# Patient Record
Sex: MALE | Race: WHITE | NOT HISPANIC OR LATINO | Employment: OTHER | URBAN - METROPOLITAN AREA
[De-identification: names, ages, dates, MRNs, and addresses within clinical notes are randomized per-mention and may not be internally consistent; named-entity substitution may affect disease eponyms.]

---

## 2018-12-10 ENCOUNTER — TRANSCRIBE ORDERS (OUTPATIENT)
Dept: ADMINISTRATIVE | Facility: HOSPITAL | Age: 73
End: 2018-12-10

## 2018-12-10 DIAGNOSIS — R89.9 ABNORMAL LABORATORY TEST: Primary | ICD-10-CM

## 2018-12-18 ENCOUNTER — HOSPITAL ENCOUNTER (OUTPATIENT)
Dept: ULTRASOUND IMAGING | Facility: HOSPITAL | Age: 73
Discharge: HOME | End: 2018-12-18
Attending: INTERNAL MEDICINE | Admitting: INTERNAL MEDICINE

## 2018-12-18 DIAGNOSIS — R89.9 ABNORMAL LABORATORY TEST: ICD-10-CM

## 2018-12-18 PROCEDURE — 76705 ECHO EXAM OF ABDOMEN: CPT

## 2021-11-22 ENCOUNTER — PRE-ADMISSION TESTING (OUTPATIENT)
Dept: PREADMISSION TESTING | Facility: HOSPITAL | Age: 76
End: 2021-11-22

## 2021-11-22 ENCOUNTER — HOSPITAL ENCOUNTER (OUTPATIENT)
Dept: GENERAL RADIOLOGY | Facility: HOSPITAL | Age: 76
Discharge: HOME | End: 2021-11-22

## 2021-11-22 VITALS — WEIGHT: 202.16 LBS | HEIGHT: 71 IN | BODY MASS INDEX: 28.3 KG/M2

## 2021-11-22 LAB
ANION GAP SERPL CALCULATED.3IONS-SCNC: 9 MMOL/L (ref 5–15)
BUN SERPL-MCNC: 22 MG/DL (ref 8–23)
BUN/CREAT SERPL: 20.2 (ref 7–25)
CALCIUM SPEC-SCNC: 10 MG/DL (ref 8.6–10.5)
CHLORIDE SERPL-SCNC: 105 MMOL/L (ref 98–107)
CO2 SERPL-SCNC: 27 MMOL/L (ref 22–29)
CREAT SERPL-MCNC: 1.09 MG/DL (ref 0.76–1.27)
DEPRECATED RDW RBC AUTO: 43.4 FL (ref 37–54)
ERYTHROCYTE [DISTWIDTH] IN BLOOD BY AUTOMATED COUNT: 13 % (ref 12.3–15.4)
GFR SERPL CREATININE-BSD FRML MDRD: 66 ML/MIN/1.73
GLUCOSE SERPL-MCNC: 88 MG/DL (ref 65–99)
HBA1C MFR BLD: 6.1 % (ref 4.8–5.6)
HCT VFR BLD AUTO: 41.1 % (ref 37.5–51)
HGB BLD-MCNC: 14 G/DL (ref 13–17.7)
MCH RBC QN AUTO: 31.3 PG (ref 26.6–33)
MCHC RBC AUTO-ENTMCNC: 34.1 G/DL (ref 31.5–35.7)
MCV RBC AUTO: 91.9 FL (ref 79–97)
PLATELET # BLD AUTO: 333 10*3/MM3 (ref 140–450)
PMV BLD AUTO: 9.6 FL (ref 6–12)
POTASSIUM SERPL-SCNC: 4.9 MMOL/L (ref 3.5–5.2)
QT INTERVAL: 388 MS
QTC INTERVAL: 419 MS
RBC # BLD AUTO: 4.47 10*6/MM3 (ref 4.14–5.8)
SODIUM SERPL-SCNC: 141 MMOL/L (ref 136–145)
WBC NRBC COR # BLD: 7.25 10*3/MM3 (ref 3.4–10.8)

## 2021-11-22 PROCEDURE — 85027 COMPLETE CBC AUTOMATED: CPT

## 2021-11-22 PROCEDURE — 80048 BASIC METABOLIC PNL TOTAL CA: CPT

## 2021-11-22 PROCEDURE — 93005 ELECTROCARDIOGRAM TRACING: CPT

## 2021-11-22 PROCEDURE — 36415 COLL VENOUS BLD VENIPUNCTURE: CPT

## 2021-11-22 PROCEDURE — 93010 ELECTROCARDIOGRAM REPORT: CPT | Performed by: INTERNAL MEDICINE

## 2021-11-22 PROCEDURE — 71046 X-RAY EXAM CHEST 2 VIEWS: CPT

## 2021-11-22 PROCEDURE — 83036 HEMOGLOBIN GLYCOSYLATED A1C: CPT

## 2021-11-22 RX ORDER — EZETIMIBE 10 MG/1
10 TABLET ORAL NIGHTLY
COMMUNITY

## 2021-11-22 RX ORDER — MULTIPLE VITAMINS W/ MINERALS TAB 9MG-400MCG
1 TAB ORAL DAILY
COMMUNITY

## 2021-11-22 RX ORDER — LORATADINE 10 MG/1
10 TABLET ORAL DAILY
COMMUNITY

## 2021-12-03 ENCOUNTER — ANESTHESIA EVENT (OUTPATIENT)
Dept: PERIOP | Facility: HOSPITAL | Age: 76
End: 2021-12-03

## 2021-12-03 ENCOUNTER — APPOINTMENT (OUTPATIENT)
Dept: PREADMISSION TESTING | Facility: HOSPITAL | Age: 76
End: 2021-12-03

## 2021-12-03 LAB — SARS-COV-2 RNA PNL SPEC NAA+PROBE: NOT DETECTED

## 2021-12-03 PROCEDURE — U0004 COV-19 TEST NON-CDC HGH THRU: HCPCS

## 2021-12-03 PROCEDURE — C9803 HOPD COVID-19 SPEC COLLECT: HCPCS

## 2021-12-05 NOTE — ANESTHESIA PREPROCEDURE EVALUATION
Anesthesia Evaluation     Patient summary reviewed and Nursing notes reviewed   no history of anesthetic complications:  NPO Solid Status: > 8 hours  NPO Liquid Status: > 2 hours           Airway   Mallampati: II  TM distance: >3 FB  Neck ROM: full  No difficulty expected  Dental - normal exam     Pulmonary - negative pulmonary ROS and normal exam   Cardiovascular - normal exam    ECG reviewed    (+) hyperlipidemia,   (-) valvular problems/murmurs, dysrhythmias, angina, NEIL      Neuro/Psych- negative ROS  GI/Hepatic/Renal/Endo    (+)  GERD well controlled,  hepatitis (at age 19), liver disease, thyroid problem hypothyroidism    Musculoskeletal     Abdominal    Substance History      OB/GYN          Other   arthritis,    history of cancer    ROS/Med Hx Other: H/o Lyme's disease                Anesthesia Plan    ASA 3     general with block     intravenous induction       Plan discussed with CRNA.

## 2021-12-06 ENCOUNTER — ANESTHESIA (OUTPATIENT)
Dept: PERIOP | Facility: HOSPITAL | Age: 76
End: 2021-12-06

## 2021-12-06 ENCOUNTER — APPOINTMENT (OUTPATIENT)
Dept: GENERAL RADIOLOGY | Facility: HOSPITAL | Age: 76
End: 2021-12-06

## 2021-12-06 ENCOUNTER — ANESTHESIA EVENT CONVERTED (OUTPATIENT)
Dept: ANESTHESIOLOGY | Facility: HOSPITAL | Age: 76
End: 2021-12-06

## 2021-12-06 ENCOUNTER — HOSPITAL ENCOUNTER (OUTPATIENT)
Facility: HOSPITAL | Age: 76
Discharge: HOME | End: 2021-12-06
Attending: ORTHOPAEDIC SURGERY | Admitting: ORTHOPAEDIC SURGERY

## 2021-12-06 VITALS
HEIGHT: 71 IN | SYSTOLIC BLOOD PRESSURE: 134 MMHG | HEART RATE: 86 BPM | OXYGEN SATURATION: 94 % | RESPIRATION RATE: 16 BRPM | TEMPERATURE: 97.6 F | WEIGHT: 202 LBS | DIASTOLIC BLOOD PRESSURE: 91 MMHG | BODY MASS INDEX: 28.28 KG/M2

## 2021-12-06 PROCEDURE — A9270 NON-COVERED ITEM OR SERVICE: HCPCS | Performed by: ORTHOPAEDIC SURGERY

## 2021-12-06 PROCEDURE — C1776 JOINT DEVICE (IMPLANTABLE): HCPCS | Performed by: ORTHOPAEDIC SURGERY

## 2021-12-06 PROCEDURE — 0 CEFAZOLIN IN DEXTROSE 2-4 GM/100ML-% SOLUTION: Performed by: ORTHOPAEDIC SURGERY

## 2021-12-06 PROCEDURE — 97116 GAIT TRAINING THERAPY: CPT

## 2021-12-06 PROCEDURE — 25010000002 DEXAMETHASONE PER 1 MG: Performed by: NURSE ANESTHETIST, CERTIFIED REGISTERED

## 2021-12-06 PROCEDURE — 63710000001 ACETAMINOPHEN 325 MG TABLET: Performed by: ORTHOPAEDIC SURGERY

## 2021-12-06 PROCEDURE — C1713 ANCHOR/SCREW BN/BN,TIS/BN: HCPCS | Performed by: ORTHOPAEDIC SURGERY

## 2021-12-06 PROCEDURE — 25010000002 PHENYLEPHRINE 10 MG/ML SOLUTION 1 ML VIAL: Performed by: NURSE ANESTHETIST, CERTIFIED REGISTERED

## 2021-12-06 PROCEDURE — 97110 THERAPEUTIC EXERCISES: CPT

## 2021-12-06 PROCEDURE — 63710000001 OXYCODONE 5 MG TABLET: Performed by: ORTHOPAEDIC SURGERY

## 2021-12-06 PROCEDURE — 97161 PT EVAL LOW COMPLEX 20 MIN: CPT

## 2021-12-06 PROCEDURE — 76942 ECHO GUIDE FOR BIOPSY: CPT | Performed by: ORTHOPAEDIC SURGERY

## 2021-12-06 PROCEDURE — 97165 OT EVAL LOW COMPLEX 30 MIN: CPT

## 2021-12-06 PROCEDURE — 97535 SELF CARE MNGMENT TRAINING: CPT

## 2021-12-06 PROCEDURE — 25010000002 VANCOMYCIN: Performed by: ORTHOPAEDIC SURGERY

## 2021-12-06 PROCEDURE — C1889 IMPLANT/INSERT DEVICE, NOC: HCPCS | Performed by: ORTHOPAEDIC SURGERY

## 2021-12-06 PROCEDURE — 25010000002 PROPOFOL 10 MG/ML EMULSION: Performed by: NURSE ANESTHETIST, CERTIFIED REGISTERED

## 2021-12-06 PROCEDURE — 25010000002 NEOSTIGMINE 10 MG/10ML SOLUTION: Performed by: NURSE ANESTHETIST, CERTIFIED REGISTERED

## 2021-12-06 PROCEDURE — 25010000002 FENTANYL CITRATE (PF) 50 MCG/ML SOLUTION: Performed by: NURSE ANESTHETIST, CERTIFIED REGISTERED

## 2021-12-06 PROCEDURE — 97530 THERAPEUTIC ACTIVITIES: CPT

## 2021-12-06 DEVICE — IMPLANTABLE DEVICE
Type: IMPLANTABLE DEVICE | Site: SHOULDER | Status: FUNCTIONAL
Brand: EQUINOXE

## 2021-12-06 DEVICE — KWIRE EQUINOXE GLENOID NITNL 2X190MM NS: Type: IMPLANTABLE DEVICE | Site: SHOULDER | Status: FUNCTIONAL

## 2021-12-06 DEVICE — STEM HUM PRI EQUINOXE PRESSFIT 9MM SHT: Type: IMPLANTABLE DEVICE | Site: SHOULDER | Status: FUNCTIONAL

## 2021-12-06 DEVICE — IMPLANTABLE DEVICE: Type: IMPLANTABLE DEVICE | Site: SHOULDER | Status: FUNCTIONAL

## 2021-12-06 DEVICE — SUT FW #2 W/TPR NDL 1/2 CIR 38IN 97CM 26.5MM BLU: Type: IMPLANTABLE DEVICE | Site: SHOULDER | Status: FUNCTIONAL

## 2021-12-06 RX ORDER — HYDROMORPHONE HYDROCHLORIDE 1 MG/ML
0.5 INJECTION, SOLUTION INTRAMUSCULAR; INTRAVENOUS; SUBCUTANEOUS
Status: DISCONTINUED | OUTPATIENT
Start: 2021-12-06 | End: 2021-12-06 | Stop reason: HOSPADM

## 2021-12-06 RX ORDER — LIDOCAINE HYDROCHLORIDE 10 MG/ML
0.5 INJECTION, SOLUTION EPIDURAL; INFILTRATION; INTRACAUDAL; PERINEURAL ONCE AS NEEDED
Status: COMPLETED | OUTPATIENT
Start: 2021-12-06 | End: 2021-12-06

## 2021-12-06 RX ORDER — SODIUM CHLORIDE 0.9 % (FLUSH) 0.9 %
10 SYRINGE (ML) INJECTION EVERY 12 HOURS SCHEDULED
Status: DISCONTINUED | OUTPATIENT
Start: 2021-12-06 | End: 2021-12-06 | Stop reason: HOSPADM

## 2021-12-06 RX ORDER — CEFAZOLIN SODIUM 2 G/100ML
2 INJECTION, SOLUTION INTRAVENOUS ONCE
Status: COMPLETED | OUTPATIENT
Start: 2021-12-06 | End: 2021-12-06

## 2021-12-06 RX ORDER — BUPIVACAINE HCL/0.9 % NACL/PF 0.125 %
PLASTIC BAG, INJECTION (ML) EPIDURAL AS NEEDED
Status: DISCONTINUED | OUTPATIENT
Start: 2021-12-06 | End: 2021-12-06 | Stop reason: SURG

## 2021-12-06 RX ORDER — MIDAZOLAM HYDROCHLORIDE 1 MG/ML
0.5 INJECTION INTRAMUSCULAR; INTRAVENOUS
Status: DISCONTINUED | OUTPATIENT
Start: 2021-12-06 | End: 2021-12-06 | Stop reason: HOSPADM

## 2021-12-06 RX ORDER — NALOXONE HCL 0.4 MG/ML
0.1 VIAL (ML) INJECTION
Status: DISCONTINUED | OUTPATIENT
Start: 2021-12-06 | End: 2021-12-06 | Stop reason: HOSPADM

## 2021-12-06 RX ORDER — MEPERIDINE HYDROCHLORIDE 25 MG/ML
12.5 INJECTION INTRAMUSCULAR; INTRAVENOUS; SUBCUTANEOUS
Status: DISCONTINUED | OUTPATIENT
Start: 2021-12-06 | End: 2021-12-06 | Stop reason: HOSPADM

## 2021-12-06 RX ORDER — ACETAMINOPHEN 325 MG/1
650 TABLET ORAL EVERY 4 HOURS PRN
Status: DISCONTINUED | OUTPATIENT
Start: 2021-12-06 | End: 2021-12-06 | Stop reason: HOSPADM

## 2021-12-06 RX ORDER — LIDOCAINE HYDROCHLORIDE 10 MG/ML
INJECTION, SOLUTION EPIDURAL; INFILTRATION; INTRACAUDAL; PERINEURAL AS NEEDED
Status: DISCONTINUED | OUTPATIENT
Start: 2021-12-06 | End: 2021-12-06 | Stop reason: SURG

## 2021-12-06 RX ORDER — FENTANYL CITRATE 50 UG/ML
50 INJECTION, SOLUTION INTRAMUSCULAR; INTRAVENOUS
Status: DISCONTINUED | OUTPATIENT
Start: 2021-12-06 | End: 2021-12-06 | Stop reason: HOSPADM

## 2021-12-06 RX ORDER — FENTANYL CITRATE 50 UG/ML
INJECTION, SOLUTION INTRAMUSCULAR; INTRAVENOUS
Status: COMPLETED | OUTPATIENT
Start: 2021-12-06 | End: 2021-12-06

## 2021-12-06 RX ORDER — ROCURONIUM BROMIDE 10 MG/ML
INJECTION, SOLUTION INTRAVENOUS AS NEEDED
Status: DISCONTINUED | OUTPATIENT
Start: 2021-12-06 | End: 2021-12-06 | Stop reason: SURG

## 2021-12-06 RX ORDER — OXYCODONE HYDROCHLORIDE 5 MG/1
5 TABLET ORAL EVERY 4 HOURS PRN
Qty: 25 TABLET | Refills: 0 | OUTPATIENT
Start: 2021-12-06 | End: 2022-11-08

## 2021-12-06 RX ORDER — NALOXONE HCL 0.4 MG/ML
0.4 VIAL (ML) INJECTION AS NEEDED
Status: DISCONTINUED | OUTPATIENT
Start: 2021-12-06 | End: 2021-12-06 | Stop reason: HOSPADM

## 2021-12-06 RX ORDER — FAMOTIDINE 10 MG/ML
20 INJECTION, SOLUTION INTRAVENOUS ONCE
Status: DISCONTINUED | OUTPATIENT
Start: 2021-12-06 | End: 2021-12-06 | Stop reason: HOSPADM

## 2021-12-06 RX ORDER — DEXAMETHASONE SODIUM PHOSPHATE 4 MG/ML
INJECTION, SOLUTION INTRA-ARTICULAR; INTRALESIONAL; INTRAMUSCULAR; INTRAVENOUS; SOFT TISSUE AS NEEDED
Status: DISCONTINUED | OUTPATIENT
Start: 2021-12-06 | End: 2021-12-06 | Stop reason: SURG

## 2021-12-06 RX ORDER — OXYCODONE HYDROCHLORIDE 5 MG/1
5 TABLET ORAL EVERY 4 HOURS PRN
Status: DISCONTINUED | OUTPATIENT
Start: 2021-12-06 | End: 2021-12-06 | Stop reason: HOSPADM

## 2021-12-06 RX ORDER — PROPOFOL 10 MG/ML
VIAL (ML) INTRAVENOUS AS NEEDED
Status: DISCONTINUED | OUTPATIENT
Start: 2021-12-06 | End: 2021-12-06 | Stop reason: SURG

## 2021-12-06 RX ORDER — EPHEDRINE SULFATE 50 MG/ML
5 INJECTION, SOLUTION INTRAVENOUS ONCE AS NEEDED
Status: DISCONTINUED | OUTPATIENT
Start: 2021-12-06 | End: 2021-12-06 | Stop reason: HOSPADM

## 2021-12-06 RX ORDER — FAMOTIDINE 20 MG/1
20 TABLET, FILM COATED ORAL ONCE
Status: COMPLETED | OUTPATIENT
Start: 2021-12-06 | End: 2021-12-06

## 2021-12-06 RX ORDER — BUPIVACAINE HYDROCHLORIDE 2.5 MG/ML
INJECTION, SOLUTION EPIDURAL; INFILTRATION; INTRACAUDAL
Status: COMPLETED | OUTPATIENT
Start: 2021-12-06 | End: 2021-12-06

## 2021-12-06 RX ORDER — GLYCOPYRROLATE 0.2 MG/ML
INJECTION INTRAMUSCULAR; INTRAVENOUS AS NEEDED
Status: DISCONTINUED | OUTPATIENT
Start: 2021-12-06 | End: 2021-12-06 | Stop reason: SURG

## 2021-12-06 RX ORDER — SODIUM CHLORIDE 0.9 % (FLUSH) 0.9 %
10 SYRINGE (ML) INJECTION AS NEEDED
Status: DISCONTINUED | OUTPATIENT
Start: 2021-12-06 | End: 2021-12-06 | Stop reason: HOSPADM

## 2021-12-06 RX ORDER — ONDANSETRON 2 MG/ML
4 INJECTION INTRAMUSCULAR; INTRAVENOUS ONCE AS NEEDED
Status: DISCONTINUED | OUTPATIENT
Start: 2021-12-06 | End: 2021-12-06 | Stop reason: HOSPADM

## 2021-12-06 RX ORDER — MAGNESIUM HYDROXIDE 1200 MG/15ML
LIQUID ORAL AS NEEDED
Status: DISCONTINUED | OUTPATIENT
Start: 2021-12-06 | End: 2021-12-06 | Stop reason: HOSPADM

## 2021-12-06 RX ORDER — SODIUM CHLORIDE 450 MG/100ML
50 INJECTION, SOLUTION INTRAVENOUS CONTINUOUS
Status: DISCONTINUED | OUTPATIENT
Start: 2021-12-06 | End: 2021-12-06 | Stop reason: HOSPADM

## 2021-12-06 RX ORDER — SODIUM CHLORIDE, SODIUM LACTATE, POTASSIUM CHLORIDE, CALCIUM CHLORIDE 600; 310; 30; 20 MG/100ML; MG/100ML; MG/100ML; MG/100ML
9 INJECTION, SOLUTION INTRAVENOUS CONTINUOUS
Status: DISCONTINUED | OUTPATIENT
Start: 2021-12-06 | End: 2021-12-06 | Stop reason: HOSPADM

## 2021-12-06 RX ORDER — ACETAMINOPHEN 500 MG
1000 TABLET ORAL ONCE
Status: COMPLETED | OUTPATIENT
Start: 2021-12-06 | End: 2021-12-06

## 2021-12-06 RX ORDER — SODIUM CHLORIDE, SODIUM LACTATE, POTASSIUM CHLORIDE, CALCIUM CHLORIDE 600; 310; 30; 20 MG/100ML; MG/100ML; MG/100ML; MG/100ML
100 INJECTION, SOLUTION INTRAVENOUS CONTINUOUS
Status: DISCONTINUED | OUTPATIENT
Start: 2021-12-06 | End: 2021-12-06 | Stop reason: HOSPADM

## 2021-12-06 RX ORDER — NEOSTIGMINE METHYLSULFATE 1 MG/ML
INJECTION, SOLUTION INTRAVENOUS AS NEEDED
Status: DISCONTINUED | OUTPATIENT
Start: 2021-12-06 | End: 2021-12-06 | Stop reason: SURG

## 2021-12-06 RX ADMIN — Medication 100 MCG: at 10:32

## 2021-12-06 RX ADMIN — BUPIVACAINE HYDROCHLORIDE 15 ML: 2.5 INJECTION, SOLUTION EPIDURAL; INFILTRATION; INTRACAUDAL at 08:34

## 2021-12-06 RX ADMIN — Medication 100 MCG: at 10:41

## 2021-12-06 RX ADMIN — LIDOCAINE HYDROCHLORIDE 0.5 ML: 10 INJECTION, SOLUTION EPIDURAL; INFILTRATION; INTRACAUDAL; PERINEURAL at 08:00

## 2021-12-06 RX ADMIN — GLYCOPYRROLATE 0.4 MG: 0.2 INJECTION INTRAMUSCULAR; INTRAVENOUS at 11:34

## 2021-12-06 RX ADMIN — OXYCODONE 5 MG: 5 TABLET ORAL at 14:03

## 2021-12-06 RX ADMIN — DEXAMETHASONE SODIUM PHOSPHATE 8 MG: 4 INJECTION, SOLUTION INTRA-ARTICULAR; INTRALESIONAL; INTRAMUSCULAR; INTRAVENOUS; SOFT TISSUE at 09:58

## 2021-12-06 RX ADMIN — Medication 100 MCG: at 10:10

## 2021-12-06 RX ADMIN — PHENYLEPHRINE HYDROCHLORIDE 0.5 MCG/KG/MIN: 10 INJECTION INTRAVENOUS at 10:54

## 2021-12-06 RX ADMIN — SODIUM CHLORIDE 50 ML/HR: 4.5 INJECTION, SOLUTION INTRAVENOUS at 14:03

## 2021-12-06 RX ADMIN — VANCOMYCIN HYDROCHLORIDE 1500 MG: 10 INJECTION, POWDER, LYOPHILIZED, FOR SOLUTION INTRAVENOUS at 09:57

## 2021-12-06 RX ADMIN — CEFAZOLIN SODIUM 2 G: 2 INJECTION, SOLUTION INTRAVENOUS at 09:09

## 2021-12-06 RX ADMIN — ACETAMINOPHEN 650 MG: 325 TABLET, FILM COATED ORAL at 14:03

## 2021-12-06 RX ADMIN — Medication 100 MCG: at 10:37

## 2021-12-06 RX ADMIN — PROPOFOL 150 MG: 10 INJECTION, EMULSION INTRAVENOUS at 09:58

## 2021-12-06 RX ADMIN — NEOSTIGMINE METHYLSULFATE 3 MG: 0.5 INJECTION INTRAVENOUS at 11:34

## 2021-12-06 RX ADMIN — SODIUM CHLORIDE, POTASSIUM CHLORIDE, SODIUM LACTATE AND CALCIUM CHLORIDE: 600; 310; 30; 20 INJECTION, SOLUTION INTRAVENOUS at 11:27

## 2021-12-06 RX ADMIN — FENTANYL CITRATE 100 MCG: 50 INJECTION, SOLUTION INTRAMUSCULAR; INTRAVENOUS at 08:34

## 2021-12-06 RX ADMIN — FAMOTIDINE 20 MG: 20 TABLET ORAL at 08:16

## 2021-12-06 RX ADMIN — Medication 100 MCG: at 10:46

## 2021-12-06 RX ADMIN — Medication 100 MCG: at 10:20

## 2021-12-06 RX ADMIN — ROCURONIUM BROMIDE 50 MG: 10 INJECTION, SOLUTION INTRAVENOUS at 09:58

## 2021-12-06 RX ADMIN — LIDOCAINE HYDROCHLORIDE 50 MG: 10 INJECTION, SOLUTION EPIDURAL; INFILTRATION; INTRACAUDAL; PERINEURAL at 09:58

## 2021-12-06 RX ADMIN — ACETAMINOPHEN 1000 MG: 500 TABLET ORAL at 08:16

## 2021-12-06 RX ADMIN — SODIUM CHLORIDE, POTASSIUM CHLORIDE, SODIUM LACTATE AND CALCIUM CHLORIDE 9 ML/HR: 600; 310; 30; 20 INJECTION, SOLUTION INTRAVENOUS at 08:00

## 2021-12-06 NOTE — PLAN OF CARE
Goal Outcome Evaluation:  Plan of Care Reviewed With: patient, spouse        Progress: improving  Outcome Summary: PT eval completed. Patient alert and oriented x4. Able to perform bed mobility with supervision, transfers with SBA, ambulation x 75' SBA, and stair navigation 2x5 steps SBA without loss of balance. No further IP PT services warranted at this time. Recommend home with assist at dc.

## 2021-12-06 NOTE — DISCHARGE INSTR - ACTIVITY
Non weight bearing right upper extremity.  You christoph shower after you remove your nerve catheter.  Absolutely no tub bathing or immersing your incision in water of any kind.   Only do the exercises as instructed by your therapist.   Please keep your dressing in place until your follow up appointment.

## 2021-12-06 NOTE — OP NOTE
DATE OF OPERATION: 12/06/21  PREOPERATIVE DIAGNOSIS: Primary osteoarthritis of right shoulder [717018]  POSTOPERATIVE DIAGNOSES:  1. Primary osteoarthritis of right shoulder [779695]  2. Biceps tenosynovitis.    PROCEDURES PERFORMED:  1.  Right reverse total shoulder arthroplasty.    2.  Right biceps tenodesis.    SURGEON: Sen Pretty MD  ASSISTANTS:  1.  Mert Quezada MD, PGY 5  2.  López Faith MD, PGY 6 sports fellow   ANESTHESIA: General plus block.    ESTIMATED BLOOD LOSS:160mL.    COMPLICATIONS: None.    DISPOSITION: Recovery room in stable condition.    IMPLANTS: [employer] Equinoxe reverse total shoulder system, 9 mm stem press-fit, 0 metal liner tray, 38 x 0 polyethylene tray, right posterior augment standard size baseplate with 4 screws with locking caps, and a 38 mm glenosphere.    INDICATIONS: This is a 76-year-old male with right shoulder pain and limited function and motion secondary to arthropathy. They have failed conservative treatment and after a discussion of risks, benefits, and alternatives, wished to proceed with shoulder arthroplasty.  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  DESCRIPTION OF PROCEDURE: On the day of surgery, the patient identified the right shoulder as the correct operative extremity. This was initialed by the surgeon with the patients's acknowledgment. The patient underwent placement of an interscalene block and was taken to the operating room and placed in the supine position. Upon induction of adequate anesthesia, the patient was brought up to the beach chair position and the shoulder and upper extremity were prepped and draped in the usual sterile fashion. Timeout confirmed the correct patient and operative extremity as well as that antibiotics were on board. A standard deltopectoral approach to the shoulder was carried out. It was carried sharply through the skin and subcutaneous tissue. Medial and lateral flaps were developed over the deltopectoral fascia.  The cephalic vein was identified and mobilized laterally with the deltoid. The subdeltoid and subpectoral spaces were mobilized and a blunt retractor was placed deep to this. The clavipectoral fascia was opened on the lateral edge of the conjoined tendon and the retractor was moved deep to this. The leading edge of the pectoralis was released exposing the long head of the biceps. This was tenosynovitic. It was tenodesed to the pectoralis and released proximal to this. The 3 sisters were identified and coagulated. A subscapularis tenotomy was performed 1 cm medial to the lesser tuberosity and rotator interval was released to the glenoid exposing the humeral head. The inferior capsule was released directly off the humerus to allow greater than 90° of external rotation. The anatomic neck was exposed and the humeral head osteotomy was performed in approximately 30° of retroversion. The remainder of the osteophytes were removed. The canal was then entered, reamed, and broached. The final stem impacted in in approximately 30° of retroversion.  The supraspinatus appeared very degenerative and was peeling some during exposure and the redness, a stiff and poorly mobile subscap, as well as his 19 degrees of retroversion and B2 glenoid and age the decision was made to proceed to a reverse.  A head protector was placed. The humerus was subluxed posteriorly. The glenoid exposed. Circumferential labral excision and capsular release were performed. A 270° mobilization of the subscapularis was carried out as well.  The coracoid and coracoid base were exposed for CT-guided navigation. The coracoid block was applied and fixed with 2 screws. We then registered the glenoid using the probe and CT-guided navigation for calibration. Using the live navigation system, a centering hole was drilled, a guidewire placed, and the glenoid was reamed to the appropriate depth, version, and inclination according to the preoperative plan. The large  central hole for the baseplate was drilled under live navigation and the cage glenoid baseplate impacted in, with excellent purchase to press-fit. Screws were then placed also under live navigation in the inferior, superior, anteroinferior, and posteroinferior locations. Locking caps were placed. The glenosphere was then inserted and locked into place with a set screw.  The humerus was carefully subluxed back anteriorly. A liner tray and polyethylene were placed and trialing was carried out. The appropriate final sizes were chosen and locked into place.  The shoulder was then reduced.  This allowed nearly full passive range of motion with no instability. The joint was copiously irrigated with orthopedic irrigation mixed with Betadine after the final implants were assembled and locked into place.  The subscapularis was not repairable.  Passive range range of motion will be full elevation but external rotation will be limited to 30° in the perioperative period. The deltopectoral interval was approximated with 0 Vicryl, the subcutaneous tissue with 2-0 Vicryl, and the skin with Monocryl and Dermabond. A sterile dressing was placed. Anesthesia was reversed and the patient was taken to the recovery room in stable condition. All instrument, needle, and sponge counts were correct.      Sen Pretty MD*

## 2021-12-06 NOTE — ANESTHESIA POSTPROCEDURE EVALUATION
Patient: Yuriy Wolf    Procedure Summary     Date: 12/06/21 Room / Location:  [facility] /  [facility]    Anesthesia Start: 0953 Anesthesia Stop:     Procedure: TOTAL REVERSE SHOULDER ARTHROPLASTY, BICEPS TENODESIS RIGHT (Right Shoulder) Diagnosis:       Primary osteoarthritis of right shoulder      Right bicipital tenosynovitis      (Primary osteoarthritis of right shoulder [864051])    Surgeons: Sen Pretty MD Provider: Angela Kyle MD    Anesthesia Type: general with block ASA Status: 3          Anesthesia Type: general with block    Vitals  No vitals data found for the desired time range.          Post Anesthesia Care and Evaluation    Patient location during evaluation: PACU  Patient participation: complete - patient participated  Level of consciousness: awake and alert  Pain score: 0  Pain management: adequate  Airway patency: patent  Anesthetic complications: No anesthetic complications  PONV Status: none  Cardiovascular status: hemodynamically stable and acceptable  Respiratory status: nonlabored ventilation, acceptable and nasal cannula  Hydration status: acceptable

## 2021-12-06 NOTE — THERAPY EVALUATION
Patient Name: Yuriy Wolf  : 1945    MRN: [id number]                              Today's Date: 2021       Admit Date: 2021    Visit Dx: No diagnosis found.  There is no problem list on file for this patient.    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 from mono age 20   • Hyperlipidemia    • Low testosterone in male    • Lyme disease 2019   • Mononucleosis    • Seasonal allergies    • Wears glasses      Past Surgical History:   Procedure Laterality Date   • BASAL CELL CARCINOMA EXCISION     • COLONOSCOPY     • EYE SURGERY Left     left eye biopsy and cornea scraping   • EYE SURGERY      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   • EYE SURGERY      biopsy of conjunctive ; destruction of lesion on cornia of left eye   • HAND SURGERY Right    • HAND SURGERY Right     remove broken hamate hook and carpal tunnel release   • JOINT REPLACEMENT Right     knee   • KNEE ACL RECONSTRUCTION Right     x2 (1994, dec 2021   • KNEE SURGERY  1994    removed screw from failed r knee ACL repair    • 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   • NECK LESION BIOPSY     • SKIN BIOPSY      lower left rear of neck    • SKIN CANCER EXCISION      removed from back of left neck       General Information     Row Name 21 1530          Physical Therapy Time and Intention    Document Type discharge evaluation/summary  -LO     Mode of Treatment individual therapy; physical therapy  -LO     Row Name 21 1530          General Information    Patient Profile Reviewed yes  -LO     Prior Level of Function independent:; all household mobility; gait; transfer  -LO     Existing Precautions/Restrictions right; shoulder; non-weight bearing  -LO     Barriers to Rehab  none identified  -LO     Row Name 12/06/21 1530          Living Environment    Lives With spouse  -     Row Name 12/06/21 1530          Home Main Entrance    Number of Stairs, Main Entrance ten  -LO     Stair Railings, Main Entrance railings safe and in good condition  -     Row Name 12/06/21 1530          Stairs Within Home, Primary    Stairs, Within Home, Primary 5+5 split foyer  -LO     Number of Stairs, Within Home, Primary none  -LO     Row Name 12/06/21 1530          Cognition    Orientation Status (Cognition) oriented x 3  -LO     Row Name 12/06/21 1530          Safety Issues, Functional Mobility    Safety Issues Affecting Function (Mobility) other (see comments)  none  -LO     Impairments Affecting Function (Mobility) endurance/activity tolerance  -LO     Comment, Safety Issues/Impairments (Mobility) no safety issues noted  -LO           User Key  (r) = Recorded By, (t) = Taken By, (c) = Cosigned By    Initials Name Provider Type    Tena Peter PT Physical Therapist               Mobility     Row Name 12/06/21 1532          Bed Mobility    Bed Mobility supine-sit; sit-supine  -LO     Supine-Sit Frederic (Bed Mobility) supervision  -LO     Sit-Supine Frederic (Bed Mobility) supervision  -LO     Assistive Device (Bed Mobility) head of bed elevated  -     Comment (Bed Mobility) no physical assist required, good sequencing noted  -     Row Name 12/06/21 1532          Transfers    Comment (Transfers) CGA for transfers for safety, no AD needed  -     Row Name 12/06/21 1532          Bed-Chair Transfer    Bed-Chair Frederic (Transfers) not tested  -     Row Name 12/06/21 1532          Sit-Stand Transfer    Sit-Stand Frederic (Transfers) contact guard  -     Row Name 12/06/21 1532          Gait/Stairs (Locomotion)    Frederic Level (Gait) standby assist  -     Assistive Device (Gait) other (see comments)  no AD  -LO     Distance in Feet (Gait) 75  -LO     Deviations/Abnormal  Patterns (Gait) other (see comments)  no deviations noted  -LO     [place] Level (Stairs) stand by assist  -LO     Assistive Device (Stairs) --  no AD  -LO     Number of Steps (Stairs) 5x2  -LO     Ascending Technique (Stairs) step-over-step  -LO     Descending Technique (Stairs) step-over-step  -LO     Comment (Gait/Stairs) Ambulates x 75' without AD with SBA. No gait deviations noted. Able to navigate 5 steps x2 with SBA, no loss of balance noted.  -LO     Row Name 12/06/21 1532          Mobility    Extremity Weight-bearing Status right upper extremity  -LO     Right Upper Extremity (Weight-bearing Status) non weight-bearing (NWB)  -LO           User Key  (r) = Recorded By, (t) = Taken By, (c) = Cosigned By    Initials Name Provider Type    Tena Peter, JANEY Physical Therapist               Obj/Interventions     Row Name 12/06/21 1537          Range of Motion Comprehensive    General Range of Motion bilateral lower extremity ROM WNL  -     Row Name 12/06/21 1537          Strength Comprehensive (MMT)    General Manual Muscle Testing (MMT) Assessment no strength deficits identified  -     Row Name 12/06/21 1537          Balance    Balance Assessment sitting static balance; sitting dynamic balance; standing static balance; standing dynamic balance  -LO     Static Sitting Balance WFL; supported; sitting, edge of bed  -LO     Dynamic Sitting Balance WFL; supported; sitting, edge of bed  -LO     Static Standing Balance WFL; supported; standing  -LO     Dynamic Standing Balance WFL; supported; standing  -LO     Comment, Balance SBA for standing, no AD requried. No loss of balance noted.  -LO           User Key  (r) = Recorded By, (t) = Taken By, (c) = Cosigned By    Initials Name Provider Type    Tena Peter, JANEY Physical Therapist               Goals/Plan    No documentation.                Clinical Impression     Row Name 12/06/21 1538          Pain Scale: FACES Pre/Post-Treatment    Pain: FACES Scale,  Pretreatment 2-->hurts little bit  -LO     Posttreatment Pain Rating 2-->hurts little bit  -LO     Pain Location - Side Right  -LO     Pain Location - Orientation generalized  -LO     Pain Location shoulder  -LO     Row Name 12/06/21 1538          Plan of Care Review    Plan of Care Reviewed With patient; spouse  -LO     Progress improving  -LO     Outcome Summary PT eval completed. Patient alert and oriented x4. Able to perform bed mobility with supervision, transfers with SBA, ambulation x 75' SBA, and stair navigation 2x5 steps SBA without loss of balance. No further IP PT services warranted at this time. Recommend home with assist at MA.  -LO     Row Name 12/06/21 1538          Therapy Assessment/Plan (PT)    Patient/Family Therapy Goals Statement (PT) go home.  -LO     Rehab Potential (PT) other (see comments)  eval only  -LO     Criteria for Skilled Interventions Met (PT) no; no problems identified which require skilled intervention  -LO     Row Name 12/06/21 1538          Vital Signs    O2 Delivery Pre Treatment room air  -LO     O2 Delivery Intra Treatment room air  -LO     O2 Delivery Post Treatment room air  -LO     Pre Patient Position Supine  -LO     Intra Patient Position Standing  -LO     Post Patient Position Supine  -LO     Rest Breaks  1  -LO     Row Name 12/06/21 1538          Positioning and Restraints    Pre-Treatment Position in bed  -LO     Post Treatment Position bed  -LO     In Bed encouraged to call for assist; supine; call light within reach; exit alarm on; fowlers; with family/caregiver  -           User Key  (r) = Recorded By, (t) = Taken By, (c) = Cosigned By    Initials Name Provider Type    Tena Peter, PT Physical Therapist               Outcome Measures     Row Name 12/06/21 5102          How much help from another person do you currently need...    Turning from your back to your side while in flat bed without using bedrails? 4  -LO     Moving from lying on back to sitting on  the side of a flat bed without bedrails? 4  -LO     Moving to and from a bed to a chair (including a wheelchair)? 4  -LO     Standing up from a chair using your arms (e.g., wheelchair, bedside chair)? 4  -LO     Climbing 3-5 steps with a railing? 4  -LO     To walk in hospital room? 4  -LO     AM-PAC 6 Clicks Score (PT) 24  -LO     Row Name 12/06/21 1542 12/06/21 1522       Functional Assessment    Outcome Measure Options AM-PAC 6 Clicks Basic Mobility (PT)  -LO AM-PAC 6 Clicks Daily Activity (OT)  -MA          User Key  (r) = Recorded By, (t) = Taken By, (c) = Cosigned By    Initials Name Provider Type    Tena Peter, PT Physical Therapist    Brittany Venegas, OT Occupational Therapist                             Physical Therapy Education                 Title: PT OT SLP Therapies (Done)     Topic: Physical Therapy (Done)     Point: Mobility training (Done)     Learning Progress Summary           Patient Acceptance, E, VU,NR by  at 12/6/2021 1520    Comment: Patient education regarding safe sequencing for stepping.                   Point: Home exercise program (Done)     Learning Progress Summary           Patient Acceptance, E, VU,NR by  at 12/6/2021 1520    Comment: Patient education regarding safe sequencing for stepping.                   Point: Body mechanics (Done)     Learning Progress Summary           Patient Acceptance, E, VU,NR by  at 12/6/2021 1520    Comment: Patient education regarding safe sequencing for stepping.                   Point: Precautions (Done)     Learning Progress Summary           Patient Acceptance, E, VU,NR by  at 12/6/2021 1520    Comment: Patient education regarding safe sequencing for stepping.                               User Key     Initials Effective Dates Name Provider Type Discipline     06/16/21 -  Tena Ha, PT Physical Therapist PT              PT Recommendation and Plan     Plan of Care Reviewed With: patient, spouse  Progress: improving  Outcome  Summary: PT eval completed. Patient alert and oriented x4. Able to perform bed mobility with supervision, transfers with SBA, ambulation x 75' SBA, and stair navigation 2x5 steps SBA without loss of balance. No further IP PT services warranted at this time. Recommend home with assist at [place].     Time Calculation:    PT Charges     Row Name 12/06/21 1520             Time Calculation    Start Time 1520  -LO      PT Received On 12/06/21  -LO      PT Goal Re-Cert Due Date 12/16/21  -LO              Timed Charges    19095 - Gait Training Minutes  12  -LO              Untimed Charges    PT Eval/Re-eval Minutes 33  -LO              Total Minutes    Timed Charges Total Minutes 12  -LO      Untimed Charges Total Minutes 33  -LO       Total Minutes 45  -LO            User Key  (r) = Recorded By, (t) = Taken By, (c) = Cosigned By    Initials Name Provider Type    Tena Peter, PT Physical Therapist              Therapy Charges for Today     Code Description Service Date Service Provider Modifiers Qty    60703264766 HC GAIT TRAINING EA 15 MIN 12/6/2021 Tena Ha, PT GP 1    28652111503 HC PT EVAL LOW COMPLEXITY 3 12/6/2021 Tena Ha, PT GP 1          PT G-Codes  Outcome Measure Options: AM-PAC 6 Clicks Basic Mobility (PT)  AM-PAC 6 Clicks Score (PT): 24  AM-PAC 6 Clicks Score (OT): 18    Tena Ha PT  12/6/2021

## 2021-12-06 NOTE — PLAN OF CARE
Goal Outcome Evaluation:  Plan of Care Reviewed With: patient, spouse           Outcome Summary: OT eval complete. Pt w/ arrowpump infusing at 6, c/o mild RUE pain. Pt's wife available for education. Pt tolerated 10 reps of RUE HEP, min A for doffing/donning RUE sling & UBD using marianna-dressing technique, sup for bed mobility, CGA for STS & functional mobility in govea w/ no noted O2 desat. Recommend home w/ A at discharge.

## 2021-12-06 NOTE — ANESTHESIA PROCEDURE NOTES
Airway  Urgency: elective    Date/Time: 12/6/2021 9:59 AM  Airway not difficult    General Information and Staff    Patient location during procedure: OR  CRNA: Jacky Orellana CRNA    Indications and Patient Condition  Indications for airway management: airway protection    Preoxygenated: yes  MILS not maintained throughout  Mask difficulty assessment: 1 - vent by mask    Final Airway Details  Final airway type: endotracheal airway      Successful airway: ETT  Cuffed: yes   Successful intubation technique: direct laryngoscopy  Endotracheal tube insertion site: oral  Blade: Medel  Blade size: 2  ETT size (mm): 7.5  Cormack-Lehane Classification: grade IIb - view of arytenoids or posterior of glottis only  Placement verified by: chest auscultation and capnometry   Cuff volume (mL): 5  Measured from: lips  ETT/EBT  to lips (cm): 23  Number of attempts at approach: 1  Assessment: lips, teeth, and gum same as pre-op and atraumatic intubation    Additional Comments  Negative epigastric sounds, Breath sound equal bilaterally with symmetric chest rise and fall

## 2021-12-06 NOTE — THERAPY EVALUATION
Patient Name: Yuriy Wolf  : 1945    MRN: [id number]                              Today's Date: 2021       Admit Date: 2021    Visit Dx: No diagnosis found.  There is no problem list on file for this patient.    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 from mono age 20   • Hyperlipidemia    • Low testosterone in male    • Lyme disease 2019   • Mononucleosis    • Seasonal allergies    • Wears glasses      Past Surgical History:   Procedure Laterality Date   • BASAL CELL CARCINOMA EXCISION     • COLONOSCOPY     • EYE SURGERY Left     left eye biopsy and cornea scraping   • EYE SURGERY      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   • EYE SURGERY      biopsy of conjunctive ; destruction of lesion on cornia of left eye   • HAND SURGERY Right    • HAND SURGERY Right     remove broken hamate hook and carpal tunnel release   • JOINT REPLACEMENT Right     knee   • KNEE ACL RECONSTRUCTION Right     x2 (1994, dec 2021   • KNEE SURGERY  1994    removed screw from failed r knee ACL repair    • 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   • NECK LESION BIOPSY     • SKIN BIOPSY      lower left rear of neck    • SKIN CANCER EXCISION      removed from back of left neck       General Information     Row Name 21 1507          OT Time and Intention    Document Type evaluation  -MA     Mode of Treatment individual therapy; occupational therapy  -MA     Row Name 21 1507          General Information    Patient Profile Reviewed yes  -MA     Prior Level of Function independent:; bed mobility; ADL's; transfer; all household mobility  -MA     Existing Precautions/Restrictions right; shoulder; non-weight bearing  Ernestina ultra II sling, R interscalene  nerve catheter  -MA     Barriers to Rehab none identified  -MA     Row Name 12/06/21 1507          Living Environment    Lives With spouse  -MA     Row Name 12/06/21 1507          Home Main Entrance    Number of Stairs, Main Entrance other (see comments)  10 steps in ( by landing 5 + 5)  -MA     Row Name 12/06/21 1507          Cognition    Orientation Status (Cognition) oriented x 3  -MA     Row Name 12/06/21 1507          Safety Issues, Functional Mobility    Safety Issues Affecting Function (Mobility) awareness of need for assistance; safety precaution awareness; safety precautions follow-through/compliance; sequencing abilities  -MA     Impairments Affecting Function (Mobility) endurance/activity tolerance; pain; sensation/sensory awareness; range of motion (ROM); strength  -MA           User Key  (r) = Recorded By, (t) = Taken By, (c) = Cosigned By    Initials Name Provider Type    Brittany Venegas OT Occupational Therapist                 Mobility/ADL's     Row Name 12/06/21 1509          Bed Mobility    Bed Mobility supine-sit; sit-supine  -MA     Supine-Sit [place] (Bed Mobility) supervision  -MA     Sit-Supine [place] (Bed Mobility) supervision  -MA     Assistive Device (Bed Mobility) head of bed elevated  -MA     Row Name 12/06/21 1509          Transfers    Transfers sit-stand transfer  -MA     Sit-Stand [place] (Transfers) contact guard  -MA     Row Name 12/06/21 1509          Sit-Stand Transfer    Assistive Device (Sit-Stand Transfers) --  No AD  -MA     [facility] Name 12/06/21 1509          Functional Mobility    Functional Mobility- Ind. Level contact guard assist; verbal cues required  -MA     Functional Mobility- Device --  No AD  -MA     Functional Mobility-Distance (Feet) 150  -MA     Functional Mobility- Comment Pt CGA for 150ft functional mobility in govea, pt c/o increased weakness in BLE's as mobility progressed  -MA     Row Name 12/06/21 1509          Activities of Daily  Living    BADL Assessment/Intervention upper body dressing; lower body dressing; bathing  -MA     Row Name 12/06/21 1509          Mobility    Extremity Weight-bearing Status right upper extremity  -MA     Right Upper Extremity (Weight-bearing Status) non weight-bearing (NWB)  -MA     Row Name 12/06/21 1509          Upper Body Dressing Assessment/Training    [place] Level (Upper Body Dressing) doff; don; other (see comments); front opening garment; minimum assist (75% patient effort); verbal cues; nonverbal cues (demo/gesture)  -MA     Position (Upper Body Dressing) edge of bed sitting  -MA     Comment (Upper Body Dressing) Pt's wife available for education, demo'd ability to doff/don RUE sling & front opening garment w/ min A, VC's and demo for sequencing. Pt and wife educated on and verbalized understanding of marianna-dressing technique & management of interscalene nerve catheter w/ ADLs to avoid dislodgement.  -MA     Row Name 12/06/21 1509          Lower Body Dressing Assessment/Training    [place] Level (Lower Body Dressing) don; pants/bottoms; moderate assist (50% patient effort); verbal cues  -MA     Position (Lower Body Dressing) edge of bed sitting; supported standing  -MA     Comment (Lower Body Dressing) Pt mod A for donning pants & undergarment.  -MA     Row Name 12/06/21 1509          Bathing Assessment/Intervention    Comment (Bathing) Pt & wife educated on importance of R axilla care and proper technique to maintain RUE precautions.  -MA           User Key  (r) = Recorded By, (t) = Taken By, (c) = Cosigned By    Initials Name Provider Type    Brittany Venegas OT Occupational Therapist               Obj/Interventions     Row Name 12/06/21 1514          Sensory Assessment (Somatosensory)    Sensory Assessment (Somatosensory) right UE  -MA     Sensory Subjective Reports numbness; tingling  -MA     Row Name 12/06/21 1514          Vision Assessment/Intervention    Visual Impairment/Limitations WFL   -MA     Row Name 12/06/21 1514          Range of Motion Comprehensive    General Range of Motion upper extremity range of motion deficits identified  -MA     Comment, General Range of Motion LUE AROM WFL; RUE PROM shoulder FE no limit, IR to chest, ER to 30, AROM elbow/wrist/hand & scapular retractions.  -MA     Row Name 12/06/21 1514          Shoulder (Therapeutic Exercise)    Shoulder (Therapeutic Exercise) PROM (passive range of motion); AROM (active range of motion)  -MA     Shoulder AROM (Therapeutic Exercise) bilateral; scapular retraction; 10 repetitions; sitting  -MA     Shoulder PROM (Therapeutic Exercise) right; flexion; extension; external rotation; internal rotation; sitting; 10 repetitions  -MA     Row Name 12/06/21 1514          Elbow/Forearm (Therapeutic Exercise)    Elbow/Forearm (Therapeutic Exercise) AAROM (active assistive range of motion)  -MA     Elbow/Forearm AAROM (Therapeutic Exercise) right; flexion; extension; supination; pronation; 10 repetitions; sitting  -MA     Row Name 12/06/21 1514          Wrist (Therapeutic Exercise)    Wrist (Therapeutic Exercise) AROM (active range of motion)  -MA     Wrist AROM (Therapeutic Exercise) right; flexion; extension; 10 repetitions  -MA     Row Name 12/06/21 1514          Hand (Therapeutic Exercise)    Hand (Therapeutic Exercise) AROM (active range of motion)  -MA     Hand AROM/AAROM (Therapeutic Exercise) right; AROM (active range of motion); finger flexion; finger extension; 10 repetitions  -MA     Row Name 12/06/21 1514          Motor Skills    Motor Skills therapeutic exercise  -MA     Row Name 12/06/21 1514          Balance    Balance Assessment sitting static balance; sitting dynamic balance; standing static balance; standing dynamic balance  -MA     Static Sitting Balance WFL; unsupported; sitting, edge of bed  -MA     Dynamic Sitting Balance WFL; unsupported; sitting, edge of bed  -MA     Static Standing Balance mild impairment; standing;  unsupported  -MA     Dynamic Standing Balance mild impairment; unsupported; standing  -MA     Balance Interventions sit to stand; occupation based/functional task  -MA     Row Name 12/06/21 1514          Therapeutic Exercise    Therapeutic Exercise shoulder; elbow/forearm; wrist; hand  -MA           User Key  (r) = Recorded By, (t) = Taken By, (c) = Cosigned By    Initials Name Provider Type    Brittany Venegas OT Occupational Therapist               Goals/Plan     Row Name 12/06/21 1519          Transfer Goal 1 (OT)    Activity/Assistive Device (Transfer Goal 1, OT) bed-to-chair/chair-to-bed; toilet; commode  -MA     [place] Level/Cues Needed (Transfer Goal 1, OT) standby assist  -MA     Time Frame (Transfer Goal 1, OT) short term goal (STG); 5 days  -MA     Progress/Outcome (Transfer Goal 1, OT) goal ongoing  -MA     Row Name 12/06/21 1519          Dressing Goal 1 (OT)    Activity/Device (Dressing Goal 1, OT) upper body dressing  Pt and wife will demo ability to doff/don RUE sling & perform UBD using marianna-dressing technique w/ sup & VC's for sequencing.  -MA     [place]/Cues Needed (Dressing Goal 1, OT) supervision required; verbal cues required  -MA     Time Frame (Dressing Goal 1, OT) short term goal (STG); 5 days  -MA     Progress/Outcome (Dressing Goal 1, OT) goal ongoing  -MA     Row Name 12/06/21 1519          ROM Goal 1 (OT)    ROM Goal 1 (OT) Pt & wife will demo ability to perform RUE HEP daily.  -MA     Time Frame (ROM Goal 1, OT) short term goal (STG); 5 days  -MA     Progress/Outcome (ROM Goal 1, OT) goal ongoing  -MA           User Key  (r) = Recorded By, (t) = Taken By, (c) = Cosigned By    Initials Name Provider Type    Brittany Venegas OT Occupational Therapist               Clinical Impression     Row Name 12/06/21 1517          Pain Assessment    Additional Documentation Pain Scale: FACES Pre/Post-Treatment (Group)  -MA     Row Name 12/06/21 1517          Pain Scale: FACES  Pre/Post-Treatment    Pain: FACES Scale, Pretreatment 2-->hurts little bit  -MA     Posttreatment Pain Rating 2-->hurts little bit  -MA     Pain Location - Side Right  -MA     Pain Location - Orientation generalized  -MA     Pain Location shoulder  -MA     Row Name 12/06/21 1517          Plan of Care Review    Plan of Care Reviewed With patient; spouse  -MA     Outcome Summary OT eval complete. Pt w/ arrowpump infusing at 6, c/o mild RUE pain. Pt's wife available for education. Pt tolerated 10 reps of RUE HEP, min A for doffing/donning RUE sling & UBD using marianna-dressing technique, sup for bed mobility, CGA for STS & functional mobility in govea w/ no noted O2 desat. Recommend home w/ A at discharge.  -MA     Row Name 12/06/21 1517          Therapy Assessment/Plan (OT)    Rehab Potential (OT) good, to achieve stated therapy goals  -MA     Criteria for Skilled Therapeutic Interventions Met (OT) yes; skilled treatment is necessary  -MA     Therapy Frequency (OT) daily  -MA     Row Name 12/06/21 1517          Therapy Plan Review/Discharge Plan (OT)    Anticipated Discharge Disposition (OT) home with assist  -MA     Row Name 12/06/21 1517          Vital Signs    Pre Systolic BP Rehab 117  -MA     Pre Treatment Diastolic BP 65  -MA     Pretreatment Heart Rate (beats/min) 91  -MA     Pre SpO2 (%) 96  -MA     O2 Delivery Pre Treatment room air  -MA     Intra SpO2 (%) 95  -MA     O2 Delivery Intra Treatment room air  -MA     Post SpO2 (%) 95  -MA     O2 Delivery Post Treatment room air  -MA     Pre Patient Position Supine  -MA     Intra Patient Position Standing  -MA     Post Patient Position Supine  -MA     Row Name 12/06/21 1517          Positioning and Restraints    Pre-Treatment Position in bed  -MA     Post Treatment Position bed  -MA     In Bed notified nsg; supine; call light within reach; encouraged to call for assist; exit alarm on; with family/caregiver; side rails up x3; with brace; RUE elevated  -MA            User Key  (r) = Recorded By, (t) = Taken By, (c) = Cosigned By    Initials Name Provider Type    Brittany Venegas OT Occupational Therapist               Outcome Measures     Row Name 12/06/21 1522          How much help from another is currently needed...    Putting on and taking off regular lower body clothing? 3  -MA     Bathing (including washing, rinsing, and drying) 3  -MA     Toileting (which includes using toilet bed pan or urinal) 3  -MA     Putting on and taking off regular upper body clothing 3  -MA     Taking care of personal grooming (such as brushing teeth) 3  -MA     Eating meals 3  -MA     AM-PAC 6 Clicks Score (OT) 18  -MA     Row Name 12/06/21 1522          Functional Assessment    Outcome Measure Options AM-PAC 6 Clicks Daily Activity (OT)  -MA           User Key  (r) = Recorded By, (t) = Taken By, (c) = Cosigned By    Initials Name Provider Type    Brittany Venegas OT Occupational Therapist                Occupational Therapy Education                 Title: PT OT SLP Therapies (Done)     Topic: Occupational Therapy (Done)     Point: ADL training (Done)     Description:   Instruct learner(s) on proper safety adaptation and remediation techniques during self care or transfers.   Instruct in proper use of assistive devices.              Learning Progress Summary           Patient Acceptance, E, VU by MA at 12/6/2021 1522   Family Acceptance, E, VU by MA at 12/6/2021 1522                   Point: Home exercise program (Done)     Description:   Instruct learner(s) on appropriate technique for monitoring, assisting and/or progressing therapeutic exercises/activities.              Learning Progress Summary           Patient Acceptance, E, VU by MA at 12/6/2021 1522   Family Acceptance, E, VU by MA at 12/6/2021 1522                   Point: Precautions (Done)     Description:   Instruct learner(s) on prescribed precautions during self-care and functional transfers.              Learning Progress  Summary           Patient Acceptance, E, VU by MA at 12/6/2021 1522   Family Acceptance, E, VU by MA at 12/6/2021 1522                   Point: Body mechanics (Done)     Description:   Instruct learner(s) on proper positioning and spine alignment during self-care, functional mobility activities and/or exercises.              Learning Progress Summary           Patient Acceptance, E, VU by MA at 12/6/2021 1522   Family Acceptance, E, VU by MA at 12/6/2021 1522                               User Key     Initials Effective Dates Name Provider Type Discipline    MA 11/19/20 -  Brittany Moya OT Occupational Therapist OT              OT Recommendation and Plan  Therapy Frequency (OT): daily  Plan of Care Review  Plan of Care Reviewed With: patient, spouse  Outcome Summary: OT eval complete. Pt w/ arrowpump infusing at 6, c/o mild RUE pain. Pt's wife available for education. Pt tolerated 10 reps of RUE HEP, min A for doffing/donning RUE sling & UBD using marianna-dressing technique, sup for bed mobility, CGA for STS & functional mobility in govea w/ no noted O2 desat. Recommend home w/ A at discharge.     Time Calculation:    Time Calculation- OT     Row Name 12/06/21 1526             Time Calculation- OT    OT Start Time 1409  -MA      OT Received On 12/06/21  -MA      OT Goal Re-Cert Due Date 12/16/21  -MA              Timed Charges    77077 - OT Therapeutic Exercise Minutes 15  -MA      29718 - OT Therapeutic Activity Minutes 11  -MA      37698 - OT Self Care/Mgmt Minutes 18  -MA              Untimed Charges    OT Eval/Re-eval Minutes 31  -MA              Total Minutes    Timed Charges Total Minutes 44  -MA      Untimed Charges Total Minutes 31  -MA       Total Minutes 75  -MA            User Key  (r) = Recorded By, (t) = Taken By, (c) = Cosigned By    Initials Name Provider Type    Brittany Venegas OT Occupational Therapist              Therapy Charges for Today     Code Description Service Date Service Provider  Modifiers Qty    91002242004 HC OT THER PROC EA 15 MIN 12/6/2021 Brittany Moya OT GO 1    77098141250 HC OT THERAPEUTIC ACT EA 15 MIN 12/6/2021 Brittany Moya OT GO 1    28125043693 HC OT SELF CARE/MGMT/TRAIN EA 15 MIN 12/6/2021 Brittany Moya OT GO 1    11126125909 HC OT EVAL LOW COMPLEXITY 3 12/6/2021 Brittany Moya OT GO 1               Brittany Moya OT  12/6/2021

## 2021-12-06 NOTE — ANESTHESIA PROCEDURE NOTES
Interscalene Catheter      Patient reassessed immediately prior to procedure    Patient location during procedure: pre-op  Reason for block: at surgeon's request and post-op pain management  Performed by  CRNA: Liz Leon CRNA  Assisted by: Eneida Winn RN  Preanesthetic Checklist  Completed: patient identified, IV checked, site marked, risks and benefits discussed, surgical consent, monitors and equipment checked, pre-op evaluation and timeout performed  Prep:  Pt Position: left lateral decubitus  Sterile barriers:cap, gloves, mask and sterile barriers  Prep: ChloraPrep  Patient monitoring: blood pressure monitoring, continuous pulse oximetry and EKG  Procedure    Sedation: yes  Performed under: local infiltration  Guidance:ultrasound guided  Images:still images obtained, printed/placed on chart    Laterality:right  Block Type:interscalene  Injection Technique:catheter  Needle Type:Tuohy and echogenic  Needle Gauge:18 G  Resistance on Injection: none  Catheter Size:20 G (20g)  Cath Depth at skin: 9 cm    Medications Used: fentaNYL citrate (PF) (SUBLIMAZE) injection, 100 mcg  bupivacaine PF (MARCAINE) 0.25 % injection, 15 mL  Med administered at 12/6/2021 8:34 AM      Post Assessment  Injection Assessment: negative aspiration for heme, no paresthesia on injection and incremental injection  Patient Tolerance:comfortable throughout block  Complications:no  Additional Notes  Procedure:                 The pt was placed in semifowlers position with a slight tilt of the thorax contralateral to the insertion site.  The Insertion Site was prepped and draped in sterile fashion.  The pt was anesthetized with  IV Sedation( see meds) and  Skin and cutaneous tissue was infiltrated and anesthetized with 1% Lidocaine 3 mls via a 25g needle.  Utilizing ultrasound guidance, a BBraun 4 inch 18 g Contiplex echogenic touhy needle was advanced in-plane.  Hydro dissection of tissue was achieved with Normal saline. Major  vessels(carotid and Internal Jugular) where visualized as the brachial plexus was approached at the approximate level of C-7/ T-1.  Cervical 5 and Branches of Cervical 6 nerve roots where visualized and the needle tip was placed posterior at the level of C-6 roots.  LA spread was visualized and injection was made incrementally every 5 mls with aspiration. Injection pressure was normal or little, there was no intraneural injection, no vascular injection.      The BBraun 20 g wire stylet catheter was then placed under US guidance on the posterior aspect of the Brachial Plexus. The tuohy was removed and the location of catheter was confirmed with NS injection visualized with US . The skin was sealed with exofin tissue adhesive at catheter insertion site.  Skin was prepped with benzoin and the catheter was secured with steristrips and a CHG tegaderm. Appropriate labels applied. Thank You.

## 2021-12-06 NOTE — H&P
Patient Care Team:      Chief complaint  Right shoulder pain    Subjective:    Patient is a 76 y.o.male presents with a history of right shoulder pain  And difficulty moving arm for months  Ain has been more severe over the last 6-8 months.  He states he has very little motion of his shoulder at this ppoint    Review of Systems:  General ROS: negative  Cardiovascular ROS: no chest pain or dyspnea on exertion  Respiratory ROS: no cough, shortness of breath, or wheezing      Allergies: No Known Allergies       Latex: neg  Contrast Dye neg    Home Meds    Medications Prior to Admission   Medication Sig Dispense Refill Last Dose   • Apoaequorin (PREVAGEN PO) Take 1 tablet by mouth Daily.   12/5/2021 at 0800   • aspirin 81 MG chewable tablet Chew 81 mg Daily.   11/28/2021 at Unknown time   • B Complex-C (SUPER B COMPLEX/VITAMIN C PO) Take 1 tablet by mouth Daily.   12/5/2021 at 0800   • brimonidine-timolol (COMBIGAN) 0.2-0.5 % ophthalmic solution Administer 1 drop to both eyes Every 12 (Twelve) Hours.   12/5/2021 at 0800   • CALCIUM-VITAMIN D PO Take 2 tablets by mouth every night at bedtime. 600MG   12/5/2021 at 1900   • celecoxib (CeleBREX) 200 MG capsule Take 200 mg by mouth Daily.   12/5/2021 at 0800   • ezetimibe (ZETIA) 10 MG tablet Take 10 mg by mouth Every Night.   12/5/2021 at 1900   • Glucos-MSM-C-Fz-Gpumtv-Fwlgmm (GLUCOSAMINE MSM COMPLEX PO) Take 1,500 mg by mouth Daily.   12/5/2021 at 1900   • levothyroxine (SYNTHROID, LEVOTHROID) 175 MCG tablet Take 175 mcg by mouth Every Morning.   12/6/2021 at 0630   • loratadine (Allergy) 10 MG tablet Take 10 mg by mouth Daily.   12/5/2021 at 0800   • multivitamin with minerals (MULTIVITAMIN ADULT PO) Take 1 tablet by mouth Daily.   12/5/2021 at 0800   • omeprazole (priLOSEC) 40 MG capsule Take 40 mg by mouth Daily.   12/5/2021 at 1900   • Testosterone (ANDROGEL) 20.25 MG/1.25GM (1.62%) gel Place 2 each on the skin as directed by provider Daily. 2 pumps/day; am right  "chest, pm left chest   12/5/2021 at 1900   • ezetimibe-simvastatin (VYTORIN) 10-40 MG per tablet Take 1 tablet by mouth Every Night.        PMH:   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 from mono age 20   • Hyperlipidemia    • Low testosterone in male    • Lyme disease 2019   • Mononucleosis    • Seasonal allergies    • Wears glasses      PSH:    Past Surgical History:   Procedure Laterality Date   • BASAL CELL CARCINOMA EXCISION     • COLONOSCOPY     • EYE SURGERY Left     left eye biopsy and cornea scraping   • EYE SURGERY      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   • EYE SURGERY      biopsy of conjunctive ; destruction of lesion on cornia of left eye   • HAND SURGERY Right    • HAND SURGERY Right     remove broken hamate hook and carpal tunnel release   • JOINT REPLACEMENT Right     knee   • KNEE ACL RECONSTRUCTION Right     x2 (nov 1994, dec 2021   • KNEE SURGERY  12/1994    removed screw from failed r knee ACL repair    • 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  COVID x2  Social History:   Tobacco neg   Alcohol neg      Physical Exam:BP (!) 166/104 (BP Location: Right arm, Patient Position: Lying)   Pulse 80   Temp 97 °F (36.1 °C) (Temporal)   Resp 17   Ht 180.3 cm (71\")   Wt 91.6 kg (202 lb)   SpO2 97%   BMI 28.17 kg/m²       General Appearance:    Alert, cooperative, no distress, appears stated age   Head:    Normocephalic, without obvious abnormality, atraumatic   Lungs:     Clear to auscultation bilaterally, respirations unlabored    Heart: " Regular rate and rhythm, S1 and S2 normal, no murmur, rub    or gallop    Abdomen:    Soft without tenderness   Breast Exam:    deferred   Genitalia:    deferred   Extremities:   Extremities normal, atraumatic, no cyanosis or edema   Skin:   Skin color, texture, turgor normal, no rashes or lesions   Neurologic:   Grossly intact     Results Review:   LABS:  Lab Results   Component Value Date    WBC 7.25 11/22/2021    HGB 14.0 11/22/2021    HCT 41.1 11/22/2021    MCV 91.9 11/22/2021     11/22/2021    GLUCOSE 88 11/22/2021    BUN 22 11/22/2021    CREATININE 1.09 11/22/2021    EGFRIFNONA 66 11/22/2021     11/22/2021    K 4.9 11/22/2021     11/22/2021    CO2 27.0 11/22/2021    CALCIUM 10.0 11/22/2021       RADIOLOGY:  Imaging Results (Last 72 Hours)     ** No results found for the last 72 hours. **               Cancer Patient: __ yes __no __unknown; If yes, clinical stage T:__ N:__M:__, stage group    Impression: osteoarthritis right shoulder    Plan: right total shoulder arthroplasty, possible reverse, biceps tenodesis  July Tracey PA-C 12/6/2021 08:50 EST

## 2021-12-08 NOTE — PROGRESS NOTES
SET[facility]land    Nerve Cath Post Op Call    Patient Name: Yuriy Wolf  :  1945  MRN:  [id number]  Date of Discharge: 2021    Nerve Cath Post Op Call:    Analgesia:Excellent  Pain Score:1/10  Catheter Site:clean  Patient Controlled ON Q pump infusion rate: 6ml/hr  Catheter Plan:Will continue with plan at home without changes  Patient/Family instructed to call ON CALL anesthesia provider for any questions or problems.  Patient Follow Up:

## 2021-12-09 NOTE — PROGRESS NOTES
SETH Kirkland    Nerve Cath Post Op Call    Patient Name: Yuriy Wolf  :  1945  MRN:  [id number]  Date of Discharge: 2021    Nerve Cath Post Op Call:    Analgesia:Excellent  Pain Score:0/10  Side Effects:None  Catheter Site:clean  Patient Controlled ON Q pump infusion rate: 6ml/hr  Catheter Plan:The patient was instructed to call ON CALL Anesthesia provider for any questions or problems and Patient/Family member report nerve catheter previously discontinued, tip intact  Patient/Family instructed to call ON CALL anesthesia provider for any questions or problems.  Patient Follow Up:

## 2022-03-06 PROCEDURE — U0004 COV-19 TEST NON-CDC HGH THRU: HCPCS | Performed by: NURSE PRACTITIONER

## 2023-07-25 ENCOUNTER — TRANSCRIBE ORDERS (OUTPATIENT)
Dept: ADMINISTRATIVE | Facility: HOSPITAL | Age: 78
End: 2023-07-25
Payer: MEDICARE

## 2023-07-25 ENCOUNTER — HOSPITAL ENCOUNTER (OUTPATIENT)
Dept: GENERAL RADIOLOGY | Facility: HOSPITAL | Age: 78
Discharge: HOME | End: 2023-07-25
Admitting: FAMILY MEDICINE
Payer: MEDICARE

## 2023-07-25 DIAGNOSIS — M16.11 PRIMARY OSTEOARTHRITIS OF RIGHT HIP: Primary | ICD-10-CM

## 2023-07-25 PROCEDURE — 73521 X-RAY EXAM HIPS BI 2 VIEWS: CPT

## (undated) DEVICE — ANTIBACTERIAL UNDYED BRAIDED (POLYGLACTIN 910), SYNTHETIC ABSORBABLE SUTURE: Brand: COATED VICRYL

## (undated) DEVICE — GLV SURG SENSICARE PI ORTHO SZ7.5 LF STRL

## (undated) DEVICE — ADHS SKIN PREMIERPRO EXOFIN TOPICAL HI/VISC .5ML

## (undated) DEVICE — BLANKT WARM LOWR/BDY 100X120CM

## (undated) DEVICE — SST TWIST DRILL, STANDARD, 2.4MM DIA. X 127MM: Brand: MICROAIRE®

## (undated) DEVICE — KT SHLDR EXACTECHGPS DISP

## (undated) DEVICE — KT PIN HEX SHLDR CORACOID EXACTECHGPS DISP

## (undated) DEVICE — TBG PENCL TELESCP MEGADYNE SMOKE EVAC 10FT

## (undated) DEVICE — PK MAJ SHLDR SPLT 10

## (undated) DEVICE — 3 BONE CEMENT MIXER: Brand: MIXEVAC

## (undated) DEVICE — GOWN,NON-REINFORCED,SIRUS,SET IN SLV,XL: Brand: MEDLINE

## (undated) DEVICE — SYR CATH/TIP 50ML 2OZ STRL 1P/U

## (undated) DEVICE — STERILE PVP: Brand: MEDLINE INDUSTRIES, INC.

## (undated) DEVICE — SUT MONOCRYL PLS ANTIB UND 3/0  PS1 27IN

## (undated) DEVICE — DRAPE,SHOULDER,BEACH CHAIR,STERILE: Brand: MEDLINE

## (undated) DEVICE — SYR LUERLOK 30CC

## (undated) DEVICE — GLV SURG SIGNATURE TOUCH PF LTX 8 STRL BX/50

## (undated) DEVICE — POSTN HD UNIV

## (undated) DEVICE — STRYKER PERFORMANCE SERIES SAGITTAL BLADE: Brand: STRYKER PERFORMANCE SERIES

## (undated) DEVICE — PATIENT RETURN ELECTRODE, SINGLE-USE, CONTACT QUALITY MONITORING, ADULT, WITH 9FT CORD, FOR PATIENTS WEIGING OVER 33LBS. (15KG): Brand: MEGADYNE

## (undated) DEVICE — PUMP PAIN AUTOFUSER AUTO SELCT NOBOLUS 1TO14ML/HR 550ML DISP

## (undated) DEVICE — HANDPIECE SET WITH HIGH FLOW TIP AND SUCTION TUBE: Brand: INTERPULSE